# Patient Record
Sex: MALE | Race: WHITE | Employment: FULL TIME | URBAN - METROPOLITAN AREA
[De-identification: names, ages, dates, MRNs, and addresses within clinical notes are randomized per-mention and may not be internally consistent; named-entity substitution may affect disease eponyms.]

---

## 2018-08-29 ENCOUNTER — HOSPITAL ENCOUNTER (EMERGENCY)
Facility: CLINIC | Age: 28
Discharge: HOME | End: 2018-08-29
Attending: FAMILY MEDICINE | Admitting: FAMILY MEDICINE
Payer: COMMERCIAL

## 2018-08-29 VITALS
DIASTOLIC BLOOD PRESSURE: 98 MMHG | SYSTOLIC BLOOD PRESSURE: 143 MMHG | BODY MASS INDEX: 28.63 KG/M2 | RESPIRATION RATE: 20 BRPM | TEMPERATURE: 98.3 F | OXYGEN SATURATION: 100 % | HEART RATE: 50 BPM | WEIGHT: 220 LBS

## 2018-08-29 DIAGNOSIS — T67.5XXA HEAT EXHAUSTION, INITIAL ENCOUNTER: ICD-10-CM

## 2018-08-29 DIAGNOSIS — R51.9 FRONTAL HEADACHE: ICD-10-CM

## 2018-08-29 DIAGNOSIS — R11.0 NAUSEA: ICD-10-CM

## 2018-08-29 LAB
ANION GAP SERPL CALCULATED.3IONS-SCNC: 8 MMOL/L (ref 3–14)
BASOPHILS # BLD AUTO: 0.1 10E9/L (ref 0–0.2)
BASOPHILS NFR BLD AUTO: 0.7 %
BUN SERPL-MCNC: 9 MG/DL (ref 7–30)
CALCIUM SERPL-MCNC: 9.2 MG/DL (ref 8.5–10.1)
CHLORIDE SERPL-SCNC: 104 MMOL/L (ref 94–109)
CO2 SERPL-SCNC: 30 MMOL/L (ref 20–32)
CREAT SERPL-MCNC: 0.88 MG/DL (ref 0.66–1.25)
CRP SERPL-MCNC: <2.9 MG/L (ref 0–8)
DIFFERENTIAL METHOD BLD: NORMAL
EOSINOPHIL NFR BLD AUTO: 2.2 %
ERYTHROCYTE [DISTWIDTH] IN BLOOD BY AUTOMATED COUNT: 12.3 % (ref 10–15)
GFR SERPL CREATININE-BSD FRML MDRD: >90 ML/MIN/1.7M2
GLUCOSE SERPL-MCNC: 106 MG/DL (ref 70–99)
HCT VFR BLD AUTO: 45.5 % (ref 40–53)
HGB BLD-MCNC: 15.4 G/DL (ref 13.3–17.7)
IMM GRANULOCYTES # BLD: 0 10E9/L (ref 0–0.4)
IMM GRANULOCYTES NFR BLD: 0.2 %
LYMPHOCYTES # BLD AUTO: 2.6 10E9/L (ref 0.8–5.3)
LYMPHOCYTES NFR BLD AUTO: 24.4 %
MCH RBC QN AUTO: 30.9 PG (ref 26.5–33)
MCHC RBC AUTO-ENTMCNC: 33.8 G/DL (ref 31.5–36.5)
MCV RBC AUTO: 91 FL (ref 78–100)
MONOCYTES # BLD AUTO: 0.9 10E9/L (ref 0–1.3)
MONOCYTES NFR BLD AUTO: 8.4 %
NEUTROPHILS # BLD AUTO: 6.8 10E9/L (ref 1.6–8.3)
NEUTROPHILS NFR BLD AUTO: 64.1 %
NRBC # BLD AUTO: 0 10*3/UL
NRBC BLD AUTO-RTO: 0 /100
PLATELET # BLD AUTO: 258 10E9/L (ref 150–450)
POTASSIUM SERPL-SCNC: 3.7 MMOL/L (ref 3.4–5.3)
RBC # BLD AUTO: 4.99 10E12/L (ref 4.4–5.9)
SODIUM SERPL-SCNC: 142 MMOL/L (ref 133–144)
WBC # BLD AUTO: 10.6 10E9/L (ref 4–11)

## 2018-08-29 PROCEDURE — 96361 HYDRATE IV INFUSION ADD-ON: CPT | Performed by: FAMILY MEDICINE

## 2018-08-29 PROCEDURE — 25000128 H RX IP 250 OP 636: Performed by: FAMILY MEDICINE

## 2018-08-29 PROCEDURE — 80048 BASIC METABOLIC PNL TOTAL CA: CPT | Performed by: FAMILY MEDICINE

## 2018-08-29 PROCEDURE — 99284 EMERGENCY DEPT VISIT MOD MDM: CPT | Mod: 25 | Performed by: FAMILY MEDICINE

## 2018-08-29 PROCEDURE — 99284 EMERGENCY DEPT VISIT MOD MDM: CPT | Mod: Z6 | Performed by: FAMILY MEDICINE

## 2018-08-29 PROCEDURE — 86140 C-REACTIVE PROTEIN: CPT | Performed by: FAMILY MEDICINE

## 2018-08-29 PROCEDURE — 96374 THER/PROPH/DIAG INJ IV PUSH: CPT | Performed by: FAMILY MEDICINE

## 2018-08-29 PROCEDURE — 85025 COMPLETE CBC W/AUTO DIFF WBC: CPT | Performed by: FAMILY MEDICINE

## 2018-08-29 PROCEDURE — 96375 TX/PRO/DX INJ NEW DRUG ADDON: CPT | Performed by: FAMILY MEDICINE

## 2018-08-29 RX ORDER — IBUPROFEN 200 MG
600 TABLET ORAL EVERY 6 HOURS PRN
Refills: 0 | COMMUNITY
Start: 2018-08-29 | End: 2018-09-01

## 2018-08-29 RX ORDER — ONDANSETRON 2 MG/ML
4 INJECTION INTRAMUSCULAR; INTRAVENOUS EVERY 30 MIN PRN
Status: DISCONTINUED | OUTPATIENT
Start: 2018-08-29 | End: 2018-08-30 | Stop reason: HOSPADM

## 2018-08-29 RX ORDER — KETOROLAC TROMETHAMINE 30 MG/ML
30 INJECTION, SOLUTION INTRAMUSCULAR; INTRAVENOUS ONCE
Status: COMPLETED | OUTPATIENT
Start: 2018-08-29 | End: 2018-08-29

## 2018-08-29 RX ADMIN — KETOROLAC TROMETHAMINE 30 MG: 30 INJECTION, SOLUTION INTRAMUSCULAR at 20:25

## 2018-08-29 RX ADMIN — SODIUM CHLORIDE 1000 ML: 9 INJECTION, SOLUTION INTRAVENOUS at 20:25

## 2018-08-29 RX ADMIN — ONDANSETRON 4 MG: 2 INJECTION INTRAMUSCULAR; INTRAVENOUS at 20:28

## 2018-08-29 ASSESSMENT — ENCOUNTER SYMPTOMS
WOUND: 0
ABDOMINAL PAIN: 0
NUMBNESS: 0
POLYPHAGIA: 0
CONFUSION: 0
CHILLS: 1
APPETITE CHANGE: 1
NECK STIFFNESS: 0
NAUSEA: 1
HEADACHES: 1
BACK PAIN: 0
SORE THROAT: 0
NECK PAIN: 0
SHORTNESS OF BREATH: 0
ACTIVITY CHANGE: 1
FATIGUE: 1
PSYCHIATRIC NEGATIVE: 1
WEAKNESS: 0
EYE PAIN: 0
TROUBLE SWALLOWING: 0
PHOTOPHOBIA: 1
VOMITING: 0
DIAPHORESIS: 0
POLYDIPSIA: 0
COUGH: 0
CHEST TIGHTNESS: 0

## 2018-08-29 NOTE — ED AVS SNAPSHOT
[facility]    [address]     [place] [address]    Phone:  [phone]    Fax:  [phone]                                       Jacobo Carver   MRN: [id number]    Department:  [facility]   Date of Visit:  8/29/2018           Patient Information     Date Of Birth          1990        Your diagnoses for this visit were:     Heat exhaustion, initial encounter     Frontal headache     Nausea        You were seen by Joe Steele DO.      Follow-up Information     Follow up with Shant Keyes MD.    Specialty:  Family Practice    Why:  As needed    Contact information:    [address]   [place] [address] [phone]          Follow up with [facility].    Specialty:  EMERGENCY MEDICINE    Why:  If symptoms worsen    Contact information:    [address]   [place] [address] [phone]    Additional information:    From [address]: Exit at [address] on south side of [place]. Turn right on [address]. Turn left at stoplight on [address]. [facility] will be in view two blocks ahead        Discharge Instructions       Please read and follow the handout(s) instructions. Return, if needed, for increased or worsening symptoms and as directed by the handout(s).    Increase your fluid intake. Drinking small amounts often is the best way to take in more fluids when you are feeling nauseated.    See the note for work.    I'm glad you are feeling improved.     Electronically signed, Joe Steele DO      Discharge References/Attachments     HEAT EXHAUSTION (ENGLISH)    HEADACHE, UNSPECIFIED (ENGLISH)    DEHYDRATION (ADULT) (ENGLISH)      24 Hour Appointment Hotline       To make an appointment at any [facility] clinic, call [phone] ([phone]). If you don't have a family doctor or clinic, we will help you find one. [facility] clinics are  conveniently located to serve the needs of you and your family.             Review of your medicines      START taking        Dose / Directions Last dose taken    ibuprofen 200 MG tablet   Commonly known as:  ADVIL/MOTRIN   Dose:  600 mg        Take 3 tablets (600 mg) by mouth every 6 hours as needed for pain or fever (TAKE WITH FOOD) TAKE WITH FOOD AS NEEDED FOR PAIN   Refills:  0          Our records show that you are taking the medicines listed below. If these are incorrect, please call your family doctor or clinic.        Dose / Directions Last dose taken    NO ACTIVE MEDICATIONS        .   Refills:  0                Prescriptions were sent or printed at these locations (1 Prescription)                   Other Prescriptions                Not Printed or Sent (1 of 1)         ibuprofen (ADVIL/MOTRIN) 200 MG tablet                Procedures and tests performed during your visit     Basic metabolic panel    CBC with platelets differential    CRP inflammation    Peripheral IV catheter      Orders Needing Specimen Collection     None      Pending Results     No orders found from 8/27/2018 to 8/30/2018.            Pending Culture Results     No orders found from 8/27/2018 to 8/30/2018.            Pending Results Instructions     If you had any lab results that were not finalized at the time of your Discharge, you can call the ED Lab Result RN at [phone]. You will be contacted by this team for any positive Lab results or changes in treatment. The nurses are available 7 days a week from 10A to 6:30P.  You can leave a message 24 hours per day and they will return your call.        Thank you for choosing [facility]       Thank you for choosing [facility] for your care. Our goal is always to provide you with excellent care. Hearing back from our patients is one way we can continue to improve our services. Please take a few minutes to complete the written survey that you may receive in the mail after you visit with us.  "Thank you!        [facility] Information     [employer] lets you send messages to your doctor, view your test results, renew your prescriptions, schedule appointments and more. To sign up, go to [email or web address] . Click on \"Log in\" on the left side of the screen, which will take you to the Welcome page. Then click on \"Sign up Now\" on the right side of the page.     You will be asked to enter the access code listed below, as well as some personal information. Please follow the directions to create your username and password.     Your access code is: [email or web address]  Expires: 2018  9:50 PM     Your access code will  in 90 days. If you need help or a new code, please call your [facility] clinic or [phone].        Care EveryWhere ID     This is your Care EveryWhere ID. This could be used by other organizations to access your [facility] medical records  [id number]        Equal Access to Services     SALLIE ARSHAD : Hadii aden gilletteo Soshari, waaxda luqadaha, qaybta kaalmada adeegyasamantha, israel wang . So [facility] [phone].    ATENCIÓN: Si habla español, tiene a maher disposición servicios gratuitos de asistencia lingüística. Llame al [phone].    We comply with applicable federal civil rights laws and [place] laws. We do not discriminate on the basis of race, color, national origin, age, disability, sex, sexual orientation, or gender identity.            After Visit Summary       This is your record. Keep this with you and show to your community pharmacist(s) and doctor(s) at your next visit.                  "

## 2018-08-29 NOTE — LETTER
2018    [facility]  Emergency Room  [address] [place]   [address]  Tel: [phone]   Fax: [phone]  2018    Jacobo Carver  [address] [address]  [phone] (home)     : 1990          To Whom it May Concern:    Jacobo Carver was seen in our ER today 2018. I expect his condition to improve over the next 1-2 days.  He may return to work, without restriction, when improved. If not improved during the above expected time period,  then I have encouraged him to be rechecked in his clinic for further evaluation.      Please contact me for questions or concerns.    Sincerely,      Joe Steele  Physician  [facility] Emergency Room

## 2018-08-29 NOTE — ED AVS SNAPSHOT
[facility] Emergency Department    [address] [place] [address]    Phone:  [phone]    Fax:  [phone]                                       Jacobo Carver   MRN: [id number]    Department:  [facility] Emergency Department   Date of Visit:  8/29/2018           After Visit Summary Signature Page     I have received my discharge instructions, and my questions have been answered. I have discussed any challenges I see with this plan with the nurse or doctor.    ..........................................................................................................................................  Patient/Patient Representative Signature      ..........................................................................................................................................  Patient Representative Print Name and Relationship to Patient    ..................................................               ................................................  Date                                            Time    ..........................................................................................................................................  Reviewed by Signature/Title    ...................................................              ..............................................  Date                                                            Time          22EPIC Rev 08/18

## 2018-08-30 NOTE — DISCHARGE INSTRUCTIONS
Please read and follow the handout(s) instructions. Return, if needed, for increased or worsening symptoms and as directed by the handout(s).    Increase your fluid intake. Drinking small amounts often is the best way to take in more fluids when you are feeling nauseated.    See the note for work.    I'm glad you are feeling improved.     Electronically signed, Joe Steele DO

## 2018-08-30 NOTE — ED TRIAGE NOTES
Pt reports headache started Friday. States he always has some pain but it varies. Reports he took Sumatriptan 25mg, one yesterday and 3 today that he felt helped some. Last one was at 5pm today. States that he also went to see a chiropractor today.

## 2018-08-30 NOTE — ED PROVIDER NOTES
History     Chief Complaint   Patient presents with     Headache     HPI  Jacobo Carver is a 28 year old male who presented to the emergency room with his girlfriend secondary concerns of a headache this been present on and off since Friday of last week.  Patient states that he has a migraine headache although when asked if he has ever been diagnosed with headaches in the past he states that he has never had one like this before.  He states he took Imitrex tablet but this was his girlfriend's medication and never used to before.  He states that he took one yesterday and it seemed to help some with his headache but he tried 3 today and he states that it is not helping much.  He also tried some Excedrin Migraine but states he can get shaky with that which he attributes to the caffeine.  When asked if he has had any fever or chills he states that he is not sure he has had fever but he has had chills on and off since Friday.  He also admits to having nausea symptoms when the headache pain is bad.  Describes the headache pain as being in the front of his head by his sinuses.  Asked if he has had any sinus congestion, he states that he has had a slight amount of congestion but no postnasal drip and no cough symptoms.  Denied any injury or fall recently.  He states that he is otherwise a healthy person although, he is a smoker but denies illicit drug use.  He denies any recent rash or insect bite.  He denies any neck pain or back pain symptoms.  He denies any problems with urination or stooling.  He states that his father was recently hospitalized with a sepsis secondary to a bladder infection.  His father is a diabetic.    Problem List:    There are no active problems to display for this patient.       Past Medical History:    History reviewed. No pertinent past medical history.    Past Surgical History:    Past Surgical History:   Procedure Laterality Date     HC REMOVAL OF TONSILS,<11 Y/O  03/16/99    tonsillectomy        Family History:    No family history on file.    Social History:  Marital Status:  Single [1]  Social History   Substance Use Topics     Smoking status: Current Every Day Smoker     Packs/day: 0.50     Types: Cigarettes     Smokeless tobacco: Never Used     Alcohol use No        Medications:      ibuprofen (ADVIL/MOTRIN) 200 MG tablet   NO ACTIVE MEDICATIONS         Review of Systems   Constitutional: Positive for activity change, appetite change, chills and fatigue. Negative for diaphoresis. Fever: ?   HENT: Positive for congestion. Negative for sore throat and trouble swallowing.    Eyes: Positive for photophobia (mild). Negative for pain and visual disturbance.   Respiratory: Negative for cough, chest tightness and shortness of breath.    Gastrointestinal: Positive for nausea. Negative for abdominal pain and vomiting.   Endocrine: Negative for polydipsia, polyphagia and polyuria.   Genitourinary: Negative.    Musculoskeletal: Negative for back pain, neck pain and neck stiffness.   Skin: Negative for rash and wound.   Neurological: Positive for headaches. Negative for weakness and numbness.   Psychiatric/Behavioral: Negative.  Negative for confusion.   All other systems reviewed and are negative.      Physical Exam   BP: (!) 146/119  Pulse: 50  Heart Rate: 74  Temp: 98.3  F (36.8  C)  Resp: 20  Weight: 99.8 kg (220 lb)  SpO2: 100 %    Vitals:    08/29/18 1932 08/29/18 2203   BP: (!) 146/119 (!) 143/98   Pulse:  50   Resp: 20    Temp: 98.3  F (36.8  C)    TempSrc: Oral    SpO2: 100%    Weight: 99.8 kg (220 lb)          Physical Exam   Constitutional: He is oriented to person, place, and time. He appears well-developed and well-nourished. He appears distressed (frontal headache).   HENT:   Head: Normocephalic and atraumatic.   Right Ear: External ear normal.   Left Ear: External ear normal.   Nose: Nose normal.   Mouth/Throat: Oropharynx is clear and moist. No oropharyngeal exudate.   Eyes: Conjunctivae and  EOM are normal. Pupils are equal, round, and reactive to light. No scleral icterus.   Neck: Normal range of motion. Neck supple. No rigidity. Normal range of motion present. No Brudzinski's sign and no Kernig's sign noted.   Cardiovascular: Normal rate, normal heart sounds and intact distal pulses.    No murmur heard.  Pulmonary/Chest: Effort normal. No respiratory distress. He has no wheezes. He has no rales. He exhibits no tenderness.   Abdominal: Soft. There is no tenderness.   Musculoskeletal: Normal range of motion.   Neurological: He is alert and oriented to person, place, and time.   Skin: Skin is warm. No rash noted. No erythema.   Psychiatric: He has a normal mood and affect. His behavior is normal. Judgment and thought content normal.   Nursing note and vitals reviewed.      ED Course     ED Course   Comment Time   Patient reexamined and notified of the reassuring blood test results.  Patient states that he is feeling markedly improved with the IV fluids and the IV Zofran and Toradol given to him.  He states that his headache is almost totally resolved at this point.  Patient states that he thinks he probably does get overheated while at work and he is also had increased stress because he thought his dad might die this last week. 08/29 2044   Patient reexamined and states he is feeling great with total resolution of his headache symptoms.  He was able to tolerate oral fluids without any difficulty or nausea.  He desires to return to home. 08/29 2146     Procedures               Critical Care time:  none               Results for orders placed or performed during the hospital encounter of 08/29/18 (from the past 24 hour(s))   CBC with platelets differential   Result Value Ref Range    WBC 10.6 4.0 - 11.0 10e9/L    RBC Count 4.99 4.4 - 5.9 10e12/L    Hemoglobin 15.4 13.3 - 17.7 g/dL    Hematocrit 45.5 40.0 - 53.0 %    MCV 91 78 - 100 fl    MCH 30.9 26.5 - 33.0 pg    MCHC 33.8 31.5 - 36.5 g/dL    RDW 12.3  10.0 - 15.0 %    Platelet Count 258 150 - 450 10e9/L    Diff Method Automated Method     % Neutrophils 64.1 %    % Lymphocytes 24.4 %    % Monocytes 8.4 %    % Eosinophils 2.2 %    % Basophils 0.7 %    % Immature Granulocytes 0.2 %    Nucleated RBCs 0 0 /100    Absolute Neutrophil 6.8 1.6 - 8.3 10e9/L    Absolute Lymphocytes 2.6 0.8 - 5.3 10e9/L    Absolute Monocytes 0.9 0.0 - 1.3 10e9/L    Absolute Basophils 0.1 0.0 - 0.2 10e9/L    Abs Immature Granulocytes 0.0 0 - 0.4 10e9/L    Absolute Nucleated RBC 0.0    CRP inflammation   Result Value Ref Range    CRP Inflammation <2.9 0.0 - 8.0 mg/L   Basic metabolic panel   Result Value Ref Range    Sodium 142 133 - 144 mmol/L    Potassium 3.7 3.4 - 5.3 mmol/L    Chloride 104 94 - 109 mmol/L    Carbon Dioxide 30 20 - 32 mmol/L    Anion Gap 8 3 - 14 mmol/L    Glucose 106 (H) 70 - 99 mg/dL    Urea Nitrogen 9 7 - 30 mg/dL    Creatinine 0.88 0.66 - 1.25 mg/dL    GFR Estimate >90 >60 mL/min/1.7m2    GFR Estimate If Black >90 >60 mL/min/1.7m2    Calcium 9.2 8.5 - 10.1 mg/dL       Medications   0.9% sodium chloride BOLUS (0 mLs Intravenous Stopped 8/29/18 2144)   ketorolac (TORADOL) injection 30 mg (30 mg Intravenous Given 8/29/18 2025)       Assessments & Plan (with Medical Decision Making)  28-year-old male to the ER secondary complaints of headache has been on and off since Friday but worse today.  Not improved with trial of his girlfriends Imitrex.  Patient with history and exam findings consistent with likely heat exhaustion with resultant dehydration and headache.  Patient had total resolution of his headache symptoms with the IV fluids and IV ketorolac given.  Is monitored for several hours and continued to be pain-free and so decision was made to discharge to home.  Screening blood tests were also reassuring.  Patient was given a note for work and encouraged to increase his fluid intake.  To avoid dehydration and to return should he have increase or worsening symptoms.      I have reviewed the nursing notes.    I have reviewed the findings, diagnosis, plan and need for follow up with the patient.       Discharge Medication List as of 8/29/2018  9:50 PM      START taking these medications    Details   ibuprofen (ADVIL/MOTRIN) 200 MG tablet Take 3 tablets (600 mg) by mouth every 6 hours as needed for pain or fever (TAKE WITH FOOD) TAKE WITH FOOD AS NEEDED FOR PAIN, R-0, OTC                  I verbally discussed the findings of the evaluation today in the ER. I have verbally discussed with Jacobo the suggested treatment(s) as described in the discharge instructions and handouts. I have prescribed the above listed medications and instructed him on appropriate use of these medications.      I have verbally suggested he follow-up in his clinic or return to the ER for increased symptoms. See the follow-up recommendations documented  in the after visit summary in this visit's EPIC chart.      Final diagnoses:   Heat exhaustion, initial encounter   Frontal headache   Nausea       8/29/2018   [facility] EMERGENCY DEPARTMENT     Joe Steele,   08/30/18 0516

## 2020-08-24 ENCOUNTER — TELEPHONE (OUTPATIENT)
Dept: FAMILY MEDICINE | Facility: CLINIC | Age: 30
End: 2020-08-24

## 2020-08-24 DIAGNOSIS — Z71.6 ENCOUNTER FOR SMOKING CESSATION COUNSELING: Primary | ICD-10-CM
